# Patient Record
Sex: FEMALE | Race: WHITE | Employment: FULL TIME | URBAN - METROPOLITAN AREA
[De-identification: names, ages, dates, MRNs, and addresses within clinical notes are randomized per-mention and may not be internally consistent; named-entity substitution may affect disease eponyms.]

---

## 2023-05-08 LAB
ABO, EXTERNAL RESULT: NORMAL
HEP B, EXTERNAL RESULT: NEGATIVE
HEPATITIS C ANTIBODY, EXTERNAL RESULT: NON REACTIVE
HIV, EXTERNAL RESULT: NON REACTIVE
RPR, EXTERNAL RESULT: NON REACTIVE
RUBELLA TITER, EXTERNAL RESULT: NORMAL

## 2023-10-30 LAB — GBS, EXTERNAL RESULT: NEGATIVE

## 2023-11-16 ENCOUNTER — HOSPITAL ENCOUNTER (INPATIENT)
Facility: HOSPITAL | Age: 38
LOS: 2 days | Discharge: HOME | End: 2023-11-18
Attending: SPECIALIST | Admitting: SPECIALIST
Payer: COMMERCIAL

## 2023-11-16 PROBLEM — O14.93 PREECLAMPSIA, THIRD TRIMESTER: Status: ACTIVE | Noted: 2023-11-16

## 2023-11-16 LAB
ABO + RH BLD: NORMAL
ALBUMIN SERPL-MCNC: 2.3 G/DL (ref 3.5–5)
ALBUMIN/GLOB SERPL: 0.5 (ref 1.1–2.2)
ALP SERPL-CCNC: 139 U/L (ref 45–117)
ALT SERPL-CCNC: 13 U/L (ref 12–78)
AMPHET UR QL SCN: NEGATIVE
ANION GAP SERPL CALC-SCNC: 8 MMOL/L (ref 5–15)
AST SERPL-CCNC: 22 U/L (ref 15–37)
BARBITURATES UR QL SCN: NEGATIVE
BENZODIAZ UR QL: NEGATIVE
BILIRUB SERPL-MCNC: 0.2 MG/DL (ref 0.2–1)
BLOOD GROUP ANTIBODIES SERPL: NORMAL
BUN SERPL-MCNC: 9 MG/DL (ref 6–20)
BUN/CREAT SERPL: 16 (ref 12–20)
CALCIUM SERPL-MCNC: 8.6 MG/DL (ref 8.5–10.1)
CANNABINOIDS UR QL SCN: NEGATIVE
CHLORIDE SERPL-SCNC: 108 MMOL/L (ref 97–108)
CO2 SERPL-SCNC: 22 MMOL/L (ref 21–32)
COCAINE UR QL SCN: NEGATIVE
CREAT SERPL-MCNC: 0.55 MG/DL (ref 0.55–1.02)
CREAT UR-MCNC: 30.8 MG/DL
ERYTHROCYTE [DISTWIDTH] IN BLOOD BY AUTOMATED COUNT: 14.1 % (ref 11.5–14.5)
GLOBULIN SER CALC-MCNC: 4.2 G/DL (ref 2–4)
GLUCOSE SERPL-MCNC: 104 MG/DL (ref 65–100)
HCT VFR BLD AUTO: 33 % (ref 35–47)
HGB BLD-MCNC: 11.4 G/DL (ref 11.5–16)
Lab: NORMAL
MCH RBC QN AUTO: 33.5 PG (ref 26–34)
MCHC RBC AUTO-ENTMCNC: 34.5 G/DL (ref 30–36.5)
MCV RBC AUTO: 97.1 FL (ref 80–99)
METHADONE UR QL: NEGATIVE
NRBC # BLD: 0 K/UL (ref 0–0.01)
NRBC BLD-RTO: 0 PER 100 WBC
OPIATES UR QL: NEGATIVE
PCP UR QL: NEGATIVE
PLATELET # BLD AUTO: 209 K/UL (ref 150–400)
PMV BLD AUTO: 9.6 FL (ref 8.9–12.9)
POTASSIUM SERPL-SCNC: 3.7 MMOL/L (ref 3.5–5.1)
PROT SERPL-MCNC: 6.5 G/DL (ref 6.4–8.2)
PROT UR-MCNC: 12 MG/DL (ref 0–11.9)
PROT/CREAT UR-RTO: 0.4
RBC # BLD AUTO: 3.4 M/UL (ref 3.8–5.2)
SODIUM SERPL-SCNC: 138 MMOL/L (ref 136–145)
SPECIMEN EXP DATE BLD: NORMAL
WBC # BLD AUTO: 7.4 K/UL (ref 3.6–11)

## 2023-11-16 PROCEDURE — 4A1HXCZ MONITORING OF PRODUCTS OF CONCEPTION, CARDIAC RATE, EXTERNAL APPROACH: ICD-10-PCS | Performed by: SPECIALIST

## 2023-11-16 PROCEDURE — 86850 RBC ANTIBODY SCREEN: CPT

## 2023-11-16 PROCEDURE — 1120000000 HC RM PRIVATE OB

## 2023-11-16 PROCEDURE — 10907ZC DRAINAGE OF AMNIOTIC FLUID, THERAPEUTIC FROM PRODUCTS OF CONCEPTION, VIA NATURAL OR ARTIFICIAL OPENING: ICD-10-PCS | Performed by: SPECIALIST

## 2023-11-16 PROCEDURE — 80053 COMPREHEN METABOLIC PANEL: CPT

## 2023-11-16 PROCEDURE — 80307 DRUG TEST PRSMV CHEM ANLYZR: CPT

## 2023-11-16 PROCEDURE — 85027 COMPLETE CBC AUTOMATED: CPT

## 2023-11-16 PROCEDURE — 36415 COLL VENOUS BLD VENIPUNCTURE: CPT

## 2023-11-16 PROCEDURE — 84156 ASSAY OF PROTEIN URINE: CPT

## 2023-11-16 PROCEDURE — 86901 BLOOD TYPING SEROLOGIC RH(D): CPT

## 2023-11-16 PROCEDURE — 6370000000 HC RX 637 (ALT 250 FOR IP): Performed by: SPECIALIST

## 2023-11-16 PROCEDURE — 99204 OFFICE O/P NEW MOD 45 MIN: CPT

## 2023-11-16 PROCEDURE — 82570 ASSAY OF URINE CREATININE: CPT

## 2023-11-16 PROCEDURE — 86900 BLOOD TYPING SEROLOGIC ABO: CPT

## 2023-11-16 RX ORDER — SODIUM CHLORIDE, SODIUM LACTATE, POTASSIUM CHLORIDE, AND CALCIUM CHLORIDE .6; .31; .03; .02 G/100ML; G/100ML; G/100ML; G/100ML
1000 INJECTION, SOLUTION INTRAVENOUS PRN
Status: DISCONTINUED | OUTPATIENT
Start: 2023-11-16 | End: 2023-11-17

## 2023-11-16 RX ORDER — ACETAMINOPHEN 325 MG/1
650 TABLET ORAL EVERY 4 HOURS PRN
Status: DISCONTINUED | OUTPATIENT
Start: 2023-11-16 | End: 2023-11-17

## 2023-11-16 RX ORDER — SODIUM CHLORIDE 0.9 % (FLUSH) 0.9 %
5-40 SYRINGE (ML) INJECTION EVERY 12 HOURS SCHEDULED
Status: DISCONTINUED | OUTPATIENT
Start: 2023-11-16 | End: 2023-11-17

## 2023-11-16 RX ORDER — SODIUM CHLORIDE, SODIUM LACTATE, POTASSIUM CHLORIDE, AND CALCIUM CHLORIDE .6; .31; .03; .02 G/100ML; G/100ML; G/100ML; G/100ML
500 INJECTION, SOLUTION INTRAVENOUS PRN
Status: DISCONTINUED | OUTPATIENT
Start: 2023-11-16 | End: 2023-11-17

## 2023-11-16 RX ORDER — DOCUSATE SODIUM 100 MG/1
100 CAPSULE, LIQUID FILLED ORAL 2 TIMES DAILY
Status: DISCONTINUED | OUTPATIENT
Start: 2023-11-16 | End: 2023-11-17 | Stop reason: SDUPTHER

## 2023-11-16 RX ORDER — CALCIUM CARBONATE 500 MG/1
1 TABLET, CHEWABLE ORAL DAILY
COMMUNITY

## 2023-11-16 RX ORDER — ZOLPIDEM TARTRATE 5 MG/1
5 TABLET ORAL NIGHTLY PRN
Status: DISCONTINUED | OUTPATIENT
Start: 2023-11-16 | End: 2023-11-18 | Stop reason: HOSPADM

## 2023-11-16 RX ORDER — CARBOPROST TROMETHAMINE 250 UG/ML
250 INJECTION, SOLUTION INTRAMUSCULAR PRN
Status: DISCONTINUED | OUTPATIENT
Start: 2023-11-16 | End: 2023-11-17

## 2023-11-16 RX ORDER — FENTANYL CITRATE 50 UG/ML
25 INJECTION, SOLUTION INTRAMUSCULAR; INTRAVENOUS
Status: DISCONTINUED | OUTPATIENT
Start: 2023-11-16 | End: 2023-11-17

## 2023-11-16 RX ORDER — SODIUM CHLORIDE, SODIUM LACTATE, POTASSIUM CHLORIDE, CALCIUM CHLORIDE 600; 310; 30; 20 MG/100ML; MG/100ML; MG/100ML; MG/100ML
INJECTION, SOLUTION INTRAVENOUS CONTINUOUS
Status: DISCONTINUED | OUTPATIENT
Start: 2023-11-16 | End: 2023-11-17

## 2023-11-16 RX ORDER — ONDANSETRON 2 MG/ML
4 INJECTION INTRAMUSCULAR; INTRAVENOUS EVERY 6 HOURS PRN
Status: DISCONTINUED | OUTPATIENT
Start: 2023-11-16 | End: 2023-11-17 | Stop reason: SDUPTHER

## 2023-11-16 RX ORDER — SODIUM CHLORIDE 9 MG/ML
25 INJECTION, SOLUTION INTRAVENOUS PRN
Status: DISCONTINUED | OUTPATIENT
Start: 2023-11-16 | End: 2023-11-17

## 2023-11-16 RX ORDER — MISOPROSTOL 200 UG/1
800 TABLET ORAL PRN
Status: DISCONTINUED | OUTPATIENT
Start: 2023-11-16 | End: 2023-11-17

## 2023-11-16 RX ORDER — METHYLERGONOVINE MALEATE 0.2 MG/ML
200 INJECTION INTRAVENOUS PRN
Status: DISCONTINUED | OUTPATIENT
Start: 2023-11-16 | End: 2023-11-17

## 2023-11-16 RX ORDER — SODIUM CHLORIDE 0.9 % (FLUSH) 0.9 %
5-40 SYRINGE (ML) INJECTION PRN
Status: DISCONTINUED | OUTPATIENT
Start: 2023-11-16 | End: 2023-11-17

## 2023-11-16 RX ADMIN — ZOLPIDEM TARTRATE 5 MG: 5 TABLET ORAL at 20:56

## 2023-11-16 RX ADMIN — Medication 25 MCG: at 15:58

## 2023-11-16 RX ADMIN — Medication 25 MCG: at 20:06

## 2023-11-16 NOTE — PROGRESS NOTES
1140: Aniket Olguin is a 41 y.o.  at 38w1d patient of Dr Hanna Arenas at [facility] who presents to L&D triage for [facility] rule out. She reports Positive FM, denies vaginal bleeding, LOF, and contractions. She also denies Headaches, Scotoma, and RUQ pain. Urine sample obtained. EFM and toco placed for initial assessment. 1143: RN attempted to call Dr Hanna Arenas, no answer. 1149: Dr Hanna Arenas telephoned, MD gave verbal orders for [facility] lab work. 1304: Dr Hanna Arenas at bedside to assess, lab work results reviewed and MD advised to move forward with delivery. SVE done with patient's consent. Plan to start cytotec and eat lunch. MD advised ok to remove from [facility] until start of cytotec.    1306: Patient transferred to [id number] for induction. 1537: Patient placed on monitor. 1558: First cytotec dose given. 1700: Patient placed on wireless Novii.    1722: Dr Beryl Schirmer at bedside to discuss plan of care. Will give cytotec x 3 but will hold 3rd dose if patient smooth often. 1910: Bedside and Verbal shift change report given to ELIZABETH Lisa RN (oncoming nurse) by PORSCHE Dang RN (offgoing nurse). Report included the following information Nurse Handoff Report and MAR.

## 2023-11-16 NOTE — H&P
History & Physical    Name: Bowen Verduzco MRN: [id number]  SSN: [id number]    YOB: 1985  Age: 41 y.o. Sex: female      Subjective:     Estimated Date of Delivery: 23  OB History    Para Term  AB Living   2 1 1     1   SAB IAB Ectopic Molar Multiple Live Births             1      # Outcome Date GA Lbr Romeo/2nd Weight Sex Delivery Anes PTL Lv   2 Current            1 Term 17    M Vag-Spont EPI  GOYO       Ms. Jerri Singh is admitted with pregnancy at 38w1d for induction of labor due to preeclampsia. Prenatal course was complicated by  AMA, IVF . Please see prenatal records for details. Past Medical History:   Diagnosis Date    Abnormal Pap smear of cervix     Asthma     Infertility, female      Past Surgical History:   Procedure Laterality Date    WISDOM TOOTH EXTRACTION       Social History     Occupational History    Not on file   Tobacco Use    Smoking status: Never    Smokeless tobacco: Never   Vaping Use    Vaping Use: Never used   Substance and Sexual Activity    Alcohol use: Not Currently    Drug use: Never    Sexual activity: Not Currently     History reviewed. No pertinent family history. No Known Allergies  Prior to Admission medications    Medication Sig Start Date End Date Taking? Authorizing Provider   metFORMIN (GLUCOPHAGE) 500 MG tablet Take 1 tablet by mouth daily (with breakfast)   Yes Jillian Moreno MD   calcium carbonate (TUMS) 500 MG chewable tablet Take 1 tablet by mouth daily   Yes Jillian Moreno MD   Omeprazole Magnesium (PRILOSEC OTC PO) Take by mouth   Yes Jillian Moreno MD   Magnesium 400 MG CAPS Take by mouth   Yes Jillian Moreno MD        Review of Systems: A comprehensive review of systems was negative except for that written in the History of Present Illness.     Objective:     Vitals:  Vitals:    23 1215 23 1230 23 1245 23 1300   BP: 115/71 (!) 122/58 126/71 133/89   Pulse: (!) 110 (!) 103 (!)

## 2023-11-17 ENCOUNTER — ANESTHESIA EVENT (OUTPATIENT)
Dept: LABOR AND DELIVERY | Facility: HOSPITAL | Age: 38
End: 2023-11-17
Payer: COMMERCIAL

## 2023-11-17 ENCOUNTER — ANESTHESIA (OUTPATIENT)
Dept: LABOR AND DELIVERY | Facility: HOSPITAL | Age: 38
End: 2023-11-17
Payer: COMMERCIAL

## 2023-11-17 PROCEDURE — 00HU33Z INSERTION OF INFUSION DEVICE INTO SPINAL CANAL, PERCUTANEOUS APPROACH: ICD-10-PCS | Performed by: ANESTHESIOLOGY

## 2023-11-17 PROCEDURE — 2580000003 HC RX 258: Performed by: SPECIALIST

## 2023-11-17 PROCEDURE — 0KQM0ZZ REPAIR PERINEUM MUSCLE, OPEN APPROACH: ICD-10-PCS | Performed by: SPECIALIST

## 2023-11-17 PROCEDURE — 6360000002 HC RX W HCPCS: Performed by: ANESTHESIOLOGY

## 2023-11-17 PROCEDURE — 3700000156 HC EPIDURAL ANESTHESIA

## 2023-11-17 PROCEDURE — 7210000100 HC LABOR FEE PER 1 HR

## 2023-11-17 PROCEDURE — 6360000002 HC RX W HCPCS: Performed by: SPECIALIST

## 2023-11-17 PROCEDURE — 7220000101 HC DELIVERY VAGINAL/SINGLE

## 2023-11-17 PROCEDURE — 6370000000 HC RX 637 (ALT 250 FOR IP): Performed by: SPECIALIST

## 2023-11-17 PROCEDURE — 1120000000 HC RM PRIVATE OB

## 2023-11-17 PROCEDURE — 2500000003 HC RX 250 WO HCPCS: Performed by: ANESTHESIOLOGY

## 2023-11-17 RX ORDER — ONDANSETRON 2 MG/ML
4 INJECTION INTRAMUSCULAR; INTRAVENOUS EVERY 6 HOURS PRN
Status: DISCONTINUED | OUTPATIENT
Start: 2023-11-17 | End: 2023-11-17

## 2023-11-17 RX ORDER — BUPIVACAINE HYDROCHLORIDE 2.5 MG/ML
INJECTION, SOLUTION EPIDURAL; INFILTRATION; INTRACAUDAL
Status: COMPLETED
Start: 2023-11-17 | End: 2023-11-17

## 2023-11-17 RX ORDER — IBUPROFEN 400 MG/1
800 TABLET ORAL EVERY 8 HOURS SCHEDULED
Status: DISCONTINUED | OUTPATIENT
Start: 2023-11-17 | End: 2023-11-18 | Stop reason: HOSPADM

## 2023-11-17 RX ORDER — LANOLIN/MINERAL OIL
LOTION (ML) TOPICAL PRN
OUTPATIENT
Start: 2023-11-17

## 2023-11-17 RX ORDER — SODIUM CHLORIDE 0.9 % (FLUSH) 0.9 %
5-40 SYRINGE (ML) INJECTION PRN
Status: DISCONTINUED | OUTPATIENT
Start: 2023-11-17 | End: 2023-11-18 | Stop reason: HOSPADM

## 2023-11-17 RX ORDER — SODIUM CHLORIDE 0.9 % (FLUSH) 0.9 %
5-40 SYRINGE (ML) INJECTION PRN
OUTPATIENT
Start: 2023-11-17

## 2023-11-17 RX ORDER — SODIUM CHLORIDE 9 MG/ML
INJECTION, SOLUTION INTRAVENOUS PRN
Status: DISCONTINUED | OUTPATIENT
Start: 2023-11-17 | End: 2023-11-18 | Stop reason: HOSPADM

## 2023-11-17 RX ORDER — IBUPROFEN 400 MG/1
800 TABLET ORAL EVERY 8 HOURS SCHEDULED
OUTPATIENT
Start: 2023-11-17

## 2023-11-17 RX ORDER — OXYCODONE HYDROCHLORIDE 5 MG/1
10 TABLET ORAL EVERY 4 HOURS PRN
OUTPATIENT
Start: 2023-11-17

## 2023-11-17 RX ORDER — DOCUSATE SODIUM 100 MG/1
100 CAPSULE, LIQUID FILLED ORAL 2 TIMES DAILY
Status: DISCONTINUED | OUTPATIENT
Start: 2023-11-17 | End: 2023-11-18 | Stop reason: HOSPADM

## 2023-11-17 RX ORDER — FENTANYL 0.2 MG/100ML-BUPIV 0.125%-NACL 0.9% EPIDURAL INJ 2/0.125 MCG/ML-%
12 SOLUTION INJECTION CONTINUOUS
Status: DISCONTINUED | OUTPATIENT
Start: 2023-11-17 | End: 2023-11-17

## 2023-11-17 RX ORDER — EPHEDRINE SULFATE/0.9% NACL/PF 50 MG/5 ML
10 SYRINGE (ML) INTRAVENOUS ONCE
Status: COMPLETED | OUTPATIENT
Start: 2023-11-17 | End: 2023-11-17

## 2023-11-17 RX ORDER — ACETAMINOPHEN 500 MG
1000 TABLET ORAL EVERY 8 HOURS SCHEDULED
Status: DISCONTINUED | OUTPATIENT
Start: 2023-11-17 | End: 2023-11-18 | Stop reason: HOSPADM

## 2023-11-17 RX ORDER — SODIUM CHLORIDE 0.9 % (FLUSH) 0.9 %
5-40 SYRINGE (ML) INJECTION EVERY 12 HOURS SCHEDULED
OUTPATIENT
Start: 2023-11-17

## 2023-11-17 RX ORDER — SODIUM CHLORIDE 0.9 % (FLUSH) 0.9 %
5-40 SYRINGE (ML) INJECTION EVERY 12 HOURS SCHEDULED
Status: DISCONTINUED | OUTPATIENT
Start: 2023-11-17 | End: 2023-11-18 | Stop reason: HOSPADM

## 2023-11-17 RX ORDER — LANOLIN/MINERAL OIL
LOTION (ML) TOPICAL PRN
Status: DISCONTINUED | OUTPATIENT
Start: 2023-11-17 | End: 2023-11-18 | Stop reason: HOSPADM

## 2023-11-17 RX ORDER — BUPIVACAINE HYDROCHLORIDE 2.5 MG/ML
INJECTION, SOLUTION EPIDURAL; INFILTRATION; INTRACAUDAL PRN
Status: DISCONTINUED | OUTPATIENT
Start: 2023-11-17 | End: 2023-11-17 | Stop reason: SDUPTHER

## 2023-11-17 RX ORDER — DOCUSATE SODIUM 100 MG/1
100 CAPSULE, LIQUID FILLED ORAL 2 TIMES DAILY
OUTPATIENT
Start: 2023-11-17

## 2023-11-17 RX ORDER — OXYCODONE HYDROCHLORIDE 5 MG/1
5 TABLET ORAL EVERY 4 HOURS PRN
OUTPATIENT
Start: 2023-11-17

## 2023-11-17 RX ORDER — FENTANYL CITRATE 50 UG/ML
INJECTION, SOLUTION INTRAMUSCULAR; INTRAVENOUS PRN
Status: DISCONTINUED | OUTPATIENT
Start: 2023-11-17 | End: 2023-11-17 | Stop reason: SDUPTHER

## 2023-11-17 RX ORDER — NALOXONE HYDROCHLORIDE 0.4 MG/ML
INJECTION, SOLUTION INTRAMUSCULAR; INTRAVENOUS; SUBCUTANEOUS PRN
Status: DISCONTINUED | OUTPATIENT
Start: 2023-11-17 | End: 2023-11-17

## 2023-11-17 RX ORDER — ACETAMINOPHEN 500 MG
1000 TABLET ORAL EVERY 8 HOURS SCHEDULED
OUTPATIENT
Start: 2023-11-17

## 2023-11-17 RX ORDER — SODIUM CHLORIDE 9 MG/ML
INJECTION, SOLUTION INTRAVENOUS PRN
OUTPATIENT
Start: 2023-11-17

## 2023-11-17 RX ORDER — MISOPROSTOL 200 UG/1
800 TABLET ORAL PRN
OUTPATIENT
Start: 2023-11-17

## 2023-11-17 RX ADMIN — BUPIVACAINE HYDROCHLORIDE 3 ML: 2.5 INJECTION, SOLUTION EPIDURAL; INFILTRATION; INTRACAUDAL; PERINEURAL at 09:35

## 2023-11-17 RX ADMIN — IBUPROFEN 800 MG: 400 TABLET, FILM COATED ORAL at 17:04

## 2023-11-17 RX ADMIN — Medication 166.7 ML: at 13:51

## 2023-11-17 RX ADMIN — FENTANYL CITRATE 100 MCG: 50 INJECTION, SOLUTION INTRAMUSCULAR; INTRAVENOUS at 09:36

## 2023-11-17 RX ADMIN — Medication 10 MG: at 09:43

## 2023-11-17 RX ADMIN — Medication 10 MG: at 09:45

## 2023-11-17 RX ADMIN — BUPIVACAINE HYDROCHLORIDE 3 ML: 2.5 INJECTION, SOLUTION EPIDURAL; INFILTRATION; INTRACAUDAL; PERINEURAL at 09:36

## 2023-11-17 RX ADMIN — DOCUSATE SODIUM 100 MG: 100 CAPSULE, LIQUID FILLED ORAL at 21:02

## 2023-11-17 RX ADMIN — BUPIVACAINE HYDROCHLORIDE 0.5 ML: 2.5 INJECTION, SOLUTION EPIDURAL; INFILTRATION; INTRACAUDAL at 09:34

## 2023-11-17 RX ADMIN — MISOPROSTOL 800 MCG: 200 TABLET ORAL at 13:51

## 2023-11-17 RX ADMIN — SODIUM CHLORIDE, POTASSIUM CHLORIDE, SODIUM LACTATE AND CALCIUM CHLORIDE: 600; 310; 30; 20 INJECTION, SOLUTION INTRAVENOUS at 09:54

## 2023-11-17 RX ADMIN — ACETAMINOPHEN 1000 MG: 500 TABLET ORAL at 21:02

## 2023-11-17 RX ADMIN — OXYTOCIN 1 MILLI-UNITS/MIN: 10 INJECTION, SOLUTION INTRAMUSCULAR; INTRAVENOUS at 06:02

## 2023-11-17 RX ADMIN — OXYTOCIN 87.3 MILLI-UNITS/MIN: 10 INJECTION, SOLUTION INTRAMUSCULAR; INTRAVENOUS at 14:03

## 2023-11-17 RX ADMIN — OXYTOCIN 2 MILLI-UNITS/MIN: 10 INJECTION, SOLUTION INTRAMUSCULAR; INTRAVENOUS at 06:50

## 2023-11-17 RX ADMIN — Medication 10 MG: at 11:39

## 2023-11-17 RX ADMIN — ONDANSETRON 4 MG: 2 INJECTION INTRAMUSCULAR; INTRAVENOUS at 09:35

## 2023-11-17 RX ADMIN — SODIUM CHLORIDE, POTASSIUM CHLORIDE, SODIUM LACTATE AND CALCIUM CHLORIDE: 600; 310; 30; 20 INJECTION, SOLUTION INTRAVENOUS at 08:37

## 2023-11-17 RX ADMIN — SODIUM CHLORIDE, POTASSIUM CHLORIDE, SODIUM LACTATE AND CALCIUM CHLORIDE: 600; 310; 30; 20 INJECTION, SOLUTION INTRAVENOUS at 06:01

## 2023-11-17 RX ADMIN — Medication 12 ML/HR: at 10:08

## 2023-11-17 ASSESSMENT — PAIN DESCRIPTION - ORIENTATION: ORIENTATION: LOWER

## 2023-11-17 ASSESSMENT — PAIN DESCRIPTION - DESCRIPTORS: DESCRIPTORS: CRAMPING

## 2023-11-17 ASSESSMENT — PAIN SCALES - GENERAL: PAINLEVEL_OUTOF10: 3

## 2023-11-17 NOTE — ANESTHESIA PROCEDURE NOTES
CSE Block    Patient location during procedure: OB  Start time: 11/17/2023 9:26 AM  End time: 11/17/2023 9:38 AM  Reason for block: labor epidural  Staffing  Performed: anesthesiologist   Anesthesiologist: Nydia Mayorga MD  Performed by: Nydia Mayorga MD  Authorized by: Nydia Mayorga MD    CSE  Patient position: sitting  Prep: ChloraPrep  Patient monitoring: continuous pulse ox and frequent blood pressure checks  Approach: midline  Provider prep: mask and sterile gloves  Spinal Needle  Needle type: pencil-tip   Needle gauge: 25 G  Needle length: 6 in  Epidural Needle  Injection technique: CLAUDINE saline  Needle type: Tuohy   Needle gauge: 17 G  Needle length: 6 in  Needle insertion depth: 6 cm  Location: lumbar (1-5)  Catheter  Catheter type: end hole  Catheter size: 19 G  Catheter at skin depth: 10 cm  Test dose: negative  [id number]  Hemodynamics: stable  Preanesthetic Checklist  Completed: patient identified, IV checked, site marked, risks and benefits discussed, surgical/procedural consents, equipment checked, pre-op evaluation, timeout performed, anesthesia consent given, oxygen available, monitors applied/VS acknowledged, fire risk safety assessment completed and verbalized and blood product R/B/A discussed and consented

## 2023-11-17 NOTE — PROCEDURES
Delivery Note    Obstetrician:  Sandy Nelson MD    Assistant: PA STUDENT [facility]    Pre-Delivery Diagnosis: Term pregnancy    Post-Delivery Diagnosis: Living  infant(s)    Intrapartum Event: None    Procedure: Spontaneous vaginal delivery    Epidural: YES    Monitor:  Fetal Heart Tones - External and Uterine Contractions - External    Indications for instrumental delivery: none    Estimated Blood Loss: 600 CC    Laceration(s):  2nd degree    Laceration(s) repair: YES    Presentation: Cephalic    Fetal Description: hartman    Fetal Position: Occiput Anterior      Apgar - One Minute: 8    Apgar - Five Minutes: 9    Umbilical Cord: 3 vessels present             Complications:  none           Cord Blood Results:   Information for the patient's :  Chen Villagomez [[id number]]   No results found for: \"PCTDIG\", \"BILI\"   Prenatal Labs:   No components found for: \"PCTABR\", \"OBEXTABSCRN\", \"OBEXTHBSAG\", \"OBEXTHIV\", \"OBEXTRUBELLA\", \"OBEXTRPR\", \"OBEXTGONORR\", \"OBEXTCHLAM\", \"OBEXTGRBS\"     Attending Attestation: I was present and scrubbed for the entire procedure.     Signed By:  Sandy Nelson MD     2023

## 2023-11-17 NOTE — ANESTHESIA POSTPROCEDURE EVALUATION
Department of Anesthesiology  Postprocedure Note    Patient: Autumn Dior  MRN: [id number]  YOB: 1985  Date of evaluation: 11/17/2023      Procedure Summary       Date: 11/17/23 Room / Location:     Anesthesia Start: 0926 Anesthesia Stop: 1347    Procedure: Labor Analgesia Diagnosis:     Scheduled Providers:  Responsible Provider: Gerber Cavazos MD    Anesthesia Type: CSE ASA Status: 2            Anesthesia Type: No value filed.     Effie Phase I:      Effie Phase II:        Anesthesia Post Evaluation    Patient location during evaluation: PACU  Patient participation: complete - patient participated  Level of consciousness: awake and alert  Airway patency: patent  Nausea & Vomiting: no vomiting and no nausea  Complications: no  Cardiovascular status: blood pressure returned to baseline and hemodynamically stable  Respiratory status: acceptable  Hydration status: stable  Pain management: satisfactory to patient

## 2023-11-17 NOTE — LACTATION NOTE
This note was copied from a baby's chart. 23 1430   Visit Information   Lactation Consult Visit Type IP Initial Consult   Visit Length 30 minutes   Reason for Visit Normal  Visit;Education   Breast Feeding History/Assessment   Left Breast Soft   Left Nipple Protrude   Right Nipple Protrude   Right Breast Soft   Breastfeeding History Yes  ( 1st baby(now 6 1/2 yrs) for 9 months)   Right Side Feeding   Infant Latch Observations Rooting; Wide open mouth;Good latch on;Sustained rhythmic suck   Infant Position Cross cradle  (Laid back)   Infant Response to Feeding Feeding well   LATCH Documentation   Latch 2   Audible Swallowing 1   Type of Nipple 2   Comfort (Breast/Nipple) 2   Hold (Positioning) 1   LATCH Score 8   Care Plan/Breast Care   Lactation Comment Baby about 1 hour old at the time of this assessment. Mother is an experienced breastfeeding mother. Baby breastfeeding well  Equipment: Medela breast pump; Measured for 24mm flanges  [place] oral assessment WDL  Parents received pacifier education     Reviewed the \"Your Guide to Breastfeeding\" booklet. Discussed the typical feeding characteristics in the 1st and 2nd DOL and signs of adequate intake. Demonstrated the asymmetric latch and observed baby showing good signs of transfer on the breast. Discussed a feeding plan and mother's questions were addressed. Plan:  Offer lots of skin to skin and access to the breast.  Feed baby at early signs of hunger every 2-3 hours. Assure a deep latch, check that baby's lips are turned outward and use breast compression to keep baby actively feeding. Pump/hand express for poor feeds and offer baby EBM. Monitor wet and dirty diapers for signs of adequate intake.

## 2023-11-17 NOTE — PROGRESS NOTES
0720: Bedside and Verbal shift change report given to [employer] (oncoming nurse) by Linda Jones (offgoing nurse). Report included the following information Nurse Handoff Report, Intake/Output, MAR, and Recent Results. [id number]: Pt up to restroom at this time     0825: SVE performed by Stephanie DURAN with consent; 2cm; membranes strip and AROM with clear fluid at this time; strip reviewed at this time     0925: Anesthesia at bedside at 0925. Patient positioned to side of the bed, EFM & TOCO adjusted to accommodate sterile field. Difficulty tracing due to maternal position. Time out completed at 0928. Successful epidural is completed by Dr Drea Gamez. Patient is repositioned supine in bed with wedge under left hip. EFM and TOCO replaced and blood pressure frequency increased. Ovalle catheter placed and epidural continuous infusion is started. 6189: Ephedrine given at this time due to hypotension and nausea    0945: Verbal order for ephedrine at this time by Drea Gamez MD due to continued hypotension    1050: Pt turned to right side with peanut ball between upper legs bilateral    1139: Verbal order for ephedrine from Drea Gamez MD at this time for hypotension; pt recently pressed bolus button due to increased pressure in RLQ     1206: SVE performed by Davian Jaquez MD; 9cm dilated; strip reviewed at this time     1328: SVE performed by Davian Jaquez MD; complete; preparing to push; pushing started at [phone]: RN remained at bedside throughout pushing. EFM continuously assessed.  of live baby girl. 1550: Bedside and Verbal shift change report given to Emely Schneider RN (oncoming nurse) by Kaykay Leblanc RN (offgoing nurse). Report included the following information Nurse Handoff Report, Intake/Output, MAR, and Recent Results.      1610: Mary Pham MD at this time to verify EBL/QBL from delivery; per MD, 600mL

## 2023-11-17 NOTE — FLOWSHEET NOTE
1910-ASSUMED CARE OF PATIENT. SBAR REPORT AT BEDSIDE. CALL LIGHT WITHIN REACH. PT FINISHED REGULAR DINNER. PTS  MAIKEL AT BEDSIDE OFFERING POSITIVE SUPPORT. NO NEEDS AT THIS TIME. POC REVIEWED.    1920-DR SCALES AT NURSES STATION, REVIEW POC. STATES PT CAN HAVE 2000 CYTOTEC, BUT HOLD OFF ON MN DOSE, AND START PITOCIN AT 0600.      0315-DR SCALES CALLED TO GET UPDATE ON PT STATUS. AWARE OF PT GETTUBG AMBIEN, AND HAD BEEN W/ NO COMPLAINTS OF PAIN. ORDERS TO START PITOCIN AT PROMPTLY 0600. ORDERS RECEIVED THAT EFM MAY D/C AT THIS TIME, UNTIL PRIOR TO STARTING PITOCIN.    0500-IN TO CK ON PT-SLEEPING. PT HAD STATED EARLIER IF SHE WAS SLEEPING, THAT SHE WOULD \"SKIP\" THE SHOWER.    0520-PT CALLED OUT STATING SHE WOULD LIKE TO SHOWER. SHOWER SUPPLIES GIVEN, IV SITE COVERED.  AT PTS SIDE OFFERING + SUPPORT. PT AWARE AFTER BACK TO BED, EFM TO BE APPLIED AND PIT TO START 30 MINUTES AFTER.    0720-SBAR REPORT TO Yessica Gilbert RN.   RELINQUISHED CARE OF PATIENT

## 2023-11-18 VITALS
DIASTOLIC BLOOD PRESSURE: 86 MMHG | TEMPERATURE: 98.1 F | WEIGHT: 191 LBS | SYSTOLIC BLOOD PRESSURE: 128 MMHG | HEART RATE: 81 BPM | RESPIRATION RATE: 16 BRPM | BODY MASS INDEX: 33.84 KG/M2 | OXYGEN SATURATION: 100 % | HEIGHT: 63 IN

## 2023-11-18 PROBLEM — O14.93 PREECLAMPSIA, THIRD TRIMESTER: Status: RESOLVED | Noted: 2023-11-16 | Resolved: 2023-11-18

## 2023-11-18 PROCEDURE — 6370000000 HC RX 637 (ALT 250 FOR IP): Performed by: SPECIALIST

## 2023-11-18 RX ORDER — PSEUDOEPHEDRINE HCL 30 MG
100 TABLET ORAL 2 TIMES DAILY
Qty: 60 CAPSULE | Refills: 0 | Status: SHIPPED | OUTPATIENT
Start: 2023-11-18

## 2023-11-18 RX ORDER — IBUPROFEN 800 MG/1
800 TABLET ORAL 2 TIMES DAILY PRN
Qty: 60 TABLET | Refills: 0 | Status: SHIPPED | OUTPATIENT
Start: 2023-11-18

## 2023-11-18 RX ADMIN — IBUPROFEN 800 MG: 400 TABLET, FILM COATED ORAL at 03:25

## 2023-11-18 RX ADMIN — ACETAMINOPHEN 1000 MG: 500 TABLET ORAL at 08:13

## 2023-11-18 RX ADMIN — IBUPROFEN 800 MG: 400 TABLET, FILM COATED ORAL at 11:05

## 2023-11-18 RX ADMIN — DOCUSATE SODIUM 100 MG: 100 CAPSULE, LIQUID FILLED ORAL at 08:13

## 2023-11-18 ASSESSMENT — PAIN DESCRIPTION - ORIENTATION: ORIENTATION: LOWER

## 2023-11-18 ASSESSMENT — PAIN SCALES - GENERAL: PAINLEVEL_OUTOF10: 1

## 2023-11-18 ASSESSMENT — PAIN DESCRIPTION - LOCATION: LOCATION: ABDOMEN

## 2023-11-18 NOTE — PROGRESS NOTES
1100: per Dr. Jagjit Mcarthur pt can DC home today due to personal issues at home. Pt updated with plan of care.

## 2023-11-18 NOTE — PROGRESS NOTES
1515: Patient discharged. Discharge instructions and medications reviewed/given. Patient verbalizes understanding. All questions answered. No distress noted, patient stable. Signed copy of discharge instructions placed on paper chart. Patient confirmed will call to make appointment in 6 weeks with Dr. Soraya Schulz. [place]  Depression Screening Prior to Discharge:      EPDS screening completed. I have completed education and provided available resources for postpartum depression to the patient. Patient has verbalized understanding of signs and symptoms to report and all questions answered. Based on EPDS score of   Postpartum Depression: Low Risk  (2023)    [place]  Depression Scale     Last EPDS Total Score: 1     Last EPDS Self Harm Result: Never    patient has a scheduled follow-up appointment in    6 weeks.

## 2023-11-18 NOTE — LACTATION NOTE
This note was copied from a baby's chart. 23 1112   Visit Information   Lactation Consult Visit Type IP Consult Follow Up   Visit Length 30 minutes   Referral Received From Lactation Consultant Follow-up   Reason for Visit Normal [place] Visit;Education   Breast Feeding History/Assessment   Left Breast Soft   Left Nipple Protrude   Right Nipple Protrude   Right Breast Soft   Breastfeeding History Yes  ( 1st baby (now 6 1/2yrs) for 9 months)   Feeding Assessment: Maternal Factors   Position and Latch Independently;Good technique   Signs of Transfer Nutritive sucking   Maternal Response Relaxed and confident   Left Side Feeding   Infant Latch Observations Rooting; Wide open mouth;Good latch on;Sustained rhythmic suck   Infant Position Cross cradle   Infant Response to Feeding Feeding well   LATCH Documentation   Latch 2   Audible Swallowing 1   Type of Nipple 2   Comfort (Breast/Nipple) 2   Hold (Positioning) 2   LATCH Score 9   Care Plan/Breast Care   Lactation Comment Observed mother using good technique latching baby and baby showing good signs of transfer. Encourage feeding baby at least by the 3rd hour or sooner if baby shows signs of hunger  Equipment: [email or web address] PS; Measured for 24mm flanges   oral assessment WDL  Parents received pacifier education     Reviewed the typical feeding characteristics in the 2nd DOL and signs of adequate intake. Mother states that breastfeeding has been going well. Discussed a feeding plan and how to manage breast engorgement. Mother's questions were addressed. Plan:  Offer lots of skin to skin and access to the breast.  Feed baby at early signs of hunger every 2-3 hours. Assure a deep latch, check that baby's lips are turned outward and use breast compression to keep baby actively feeding. Pump/hand express for poor feeds and offer baby EBM. Monitor wet and dirty diapers for signs of adequate intake.

## 2024-10-31 ENCOUNTER — HOSPITAL ENCOUNTER (OUTPATIENT)
Facility: HOSPITAL | Age: 39
Discharge: HOME | End: 2024-11-03
Payer: COMMERCIAL

## 2024-10-31 ENCOUNTER — TRANSCRIBE ORDERS (OUTPATIENT)
Facility: HOSPITAL | Age: 39
End: 2024-10-31

## 2024-10-31 DIAGNOSIS — R20.2 PARESTHESIA OF SKIN: ICD-10-CM

## 2024-10-31 DIAGNOSIS — R20.0 ANESTHESIA OF SKIN: Primary | ICD-10-CM

## 2024-10-31 DIAGNOSIS — R20.0 ANESTHESIA OF SKIN: ICD-10-CM

## 2024-10-31 PROCEDURE — 72040 X-RAY EXAM NECK SPINE 2-3 VW: CPT

## 2024-10-31 PROCEDURE — 72100 X-RAY EXAM L-S SPINE 2/3 VWS: CPT

## 2025-04-29 ENCOUNTER — TRANSCRIBE ORDERS (OUTPATIENT)
Facility: HOSPITAL | Age: 40
End: 2025-04-29

## 2025-04-29 DIAGNOSIS — M54.50 LUMBAR PAIN: Primary | ICD-10-CM

## 2025-04-29 DIAGNOSIS — M54.16 LUMBAR RADICULOPATHY: ICD-10-CM
